# Patient Record
Sex: FEMALE
[De-identification: names, ages, dates, MRNs, and addresses within clinical notes are randomized per-mention and may not be internally consistent; named-entity substitution may affect disease eponyms.]

---

## 2020-08-14 ENCOUNTER — TRANSCRIPTION ENCOUNTER (OUTPATIENT)
Age: 20
End: 2020-08-14

## 2021-05-11 PROBLEM — Z00.00 ENCOUNTER FOR PREVENTIVE HEALTH EXAMINATION: Status: ACTIVE | Noted: 2021-05-11

## 2021-05-18 ENCOUNTER — APPOINTMENT (OUTPATIENT)
Dept: PLASTIC SURGERY | Facility: CLINIC | Age: 21
End: 2021-05-18
Payer: SELF-PAY

## 2021-05-18 VITALS — HEIGHT: 67 IN | BODY MASS INDEX: 24.33 KG/M2 | WEIGHT: 155 LBS

## 2021-05-18 DIAGNOSIS — E10.51 TYPE 1 DIABETES MELLITUS WITH DIABETIC PERIPHERAL ANGIOPATHY W/OUT GANGRENE: ICD-10-CM

## 2021-05-18 PROCEDURE — 99203 OFFICE O/P NEW LOW 30 MIN: CPT

## 2021-05-18 NOTE — HISTORY OF PRESENT ILLNESS
[FreeTextEntry1] : Patient present to the office for a Rhinoplasty consult.\par Patient doesetn have diffuculity breathing, no frequent congestion, sinis, or allergies, Patient does however snore \par No Prior Nasal surgeries, Patient has type one diabetes and takes insulin also is allergic to amoxicillin and azithromycin.\par

## 2021-05-25 ENCOUNTER — APPOINTMENT (OUTPATIENT)
Dept: PLASTIC SURGERY | Facility: CLINIC | Age: 21
End: 2021-05-25
Payer: SELF-PAY

## 2021-05-25 DIAGNOSIS — Z78.9 OTHER SPECIFIED HEALTH STATUS: ICD-10-CM

## 2021-05-25 DIAGNOSIS — Z83.3 FAMILY HISTORY OF DIABETES MELLITUS: ICD-10-CM

## 2021-05-25 PROCEDURE — 30400A: CUSTOM

## 2021-05-25 RX ORDER — DIAZEPAM 5 MG/1
5 TABLET ORAL
Qty: 1 | Refills: 0 | Status: ACTIVE | COMMUNITY
Start: 2021-05-25 | End: 1900-01-01

## 2021-05-26 NOTE — PROCEDURE
[FreeTextEntry1] : dorsal hump [FreeTextEntry2] : closed nasal hump reduction [FreeTextEntry3] : lido w/ epi [FreeTextEntry4] : min [FreeTextEntry5] : none [FreeTextEntry6] : IC obtained.  lido w/ epi injected. 15 blade used to incise intercartilaginous.  blunt dissection performed.  2mm nasal rasp used to reduce nasal dorsum bump\par good contour obtained\par steri strips placed\par

## 2021-06-01 ENCOUNTER — APPOINTMENT (OUTPATIENT)
Dept: PLASTIC SURGERY | Facility: CLINIC | Age: 21
End: 2021-06-01
Payer: SELF-PAY

## 2021-06-01 DIAGNOSIS — Z41.1 ENCOUNTER FOR COSMETIC SURGERY: ICD-10-CM

## 2021-06-01 PROCEDURE — 99024 POSTOP FOLLOW-UP VISIT: CPT

## 2023-06-28 ENCOUNTER — NON-APPOINTMENT (OUTPATIENT)
Age: 23
End: 2023-06-28

## 2023-07-13 ENCOUNTER — NON-APPOINTMENT (OUTPATIENT)
Age: 23
End: 2023-07-13

## 2023-10-23 ENCOUNTER — OFFICE (OUTPATIENT)
Dept: URBAN - METROPOLITAN AREA CLINIC 92 | Facility: CLINIC | Age: 23
Setting detail: OPHTHALMOLOGY
End: 2023-10-23
Payer: COMMERCIAL

## 2023-10-23 DIAGNOSIS — E10.9: ICD-10-CM

## 2023-10-23 DIAGNOSIS — H16.223: ICD-10-CM

## 2023-10-23 PROCEDURE — 92014 COMPRE OPH EXAM EST PT 1/>: CPT | Performed by: SPECIALIST

## 2023-10-23 ASSESSMENT — REFRACTION_AUTOREFRACTION
OD_SPHERE: -2.25
OS_SPHERE: -2.25
OS_CYLINDER: +0.25
OD_CYLINDER: 0.00
OD_AXIS: 000
OS_AXIS: 142

## 2023-10-23 ASSESSMENT — REFRACTION_MANIFEST
OD_AXIS: 080
OS_CYLINDER: +0.25
OS_VA1: 20/20
OD_VA1: 20/20
OD_SPHERE: -1.25
OU_VA: 20/20
OD_CYLINDER: +0.25
OS_SPHERE: -1.50
OS_AXIS: 105

## 2023-10-23 ASSESSMENT — KERATOMETRY
OD_K2POWER_DIOPTERS: 45.75
OS_AXISANGLE_DEGREES: 097
OS_K1POWER_DIOPTERS: 45.75
OS_K2POWER_DIOPTERS: 47.00
METHOD_AUTO_MANUAL: AUTO
OD_AXISANGLE_DEGREES: 090
OD_K1POWER_DIOPTERS: 45.25

## 2023-10-23 ASSESSMENT — REFRACTION_CURRENTRX
OD_CYLINDER: 0.00
OD_OVR_VA: 20/
OD_AXIS: 180
OD_VPRISM_DIRECTION: SV
OS_VPRISM_DIRECTION: SV
OS_SPHERE: -1.75
OS_OVR_VA: 20/
OS_AXIS: 180
OS_CYLINDER: 0.00
OD_SPHERE: -1.75

## 2023-10-23 ASSESSMENT — LID EXAM ASSESSMENTS
OS_MEIBOMITIS: LLL LUL 1+
OD_MEIBOMITIS: RLL RUL 1+

## 2023-10-23 ASSESSMENT — AXIALLENGTH_DERIVED
OS_AL: 23.0828
OD_AL: 23.7348
OD_AL: 23.2993
OS_AL: 23.366

## 2023-10-23 ASSESSMENT — SUPERFICIAL PUNCTATE KERATITIS (SPK)
OD_SPK: T
OS_SPK: T

## 2023-10-23 ASSESSMENT — SPHEQUIV_DERIVED
OD_SPHEQUIV: -2.25
OD_SPHEQUIV: -1.125
OS_SPHEQUIV: -2.125
OS_SPHEQUIV: -1.375

## 2023-10-23 ASSESSMENT — TONOMETRY
OD_IOP_MMHG: 17
OS_IOP_MMHG: 17

## 2023-10-23 ASSESSMENT — VISUAL ACUITY
OD_BCVA: 20/20
OS_BCVA: 20/25-2